# Patient Record
Sex: FEMALE | Race: OTHER | HISPANIC OR LATINO | Employment: UNEMPLOYED | URBAN - METROPOLITAN AREA
[De-identification: names, ages, dates, MRNs, and addresses within clinical notes are randomized per-mention and may not be internally consistent; named-entity substitution may affect disease eponyms.]

---

## 2018-11-29 ENCOUNTER — HOSPITAL ENCOUNTER (EMERGENCY)
Facility: HOSPITAL | Age: 63
Discharge: HOME | End: 2018-11-29
Attending: EMERGENCY MEDICINE
Payer: COMMERCIAL

## 2018-11-29 VITALS
OXYGEN SATURATION: 97 % | SYSTOLIC BLOOD PRESSURE: 153 MMHG | TEMPERATURE: 97.7 F | HEIGHT: 59 IN | DIASTOLIC BLOOD PRESSURE: 80 MMHG | BODY MASS INDEX: 33.58 KG/M2 | HEART RATE: 107 BPM | WEIGHT: 166.56 LBS | RESPIRATION RATE: 18 BRPM

## 2018-11-29 DIAGNOSIS — B02.9 SHINGLES: Primary | ICD-10-CM

## 2018-11-29 PROCEDURE — 99282 EMERGENCY DEPT VISIT SF MDM: CPT

## 2018-11-29 RX ORDER — VALACYCLOVIR HYDROCHLORIDE 500 MG/1
1000 TABLET, FILM COATED ORAL EVERY 8 HOURS SCHEDULED
Status: DISCONTINUED | OUTPATIENT
Start: 2018-11-29 | End: 2018-11-29 | Stop reason: HOSPADM

## 2018-11-29 RX ORDER — VALACYCLOVIR HYDROCHLORIDE 1 G/1
1000 TABLET, FILM COATED ORAL EVERY 8 HOURS SCHEDULED
Qty: 21 TABLET | Refills: 0 | Status: SHIPPED | OUTPATIENT
Start: 2018-11-29 | End: 2018-12-06

## 2018-11-29 RX ADMIN — VALACYCLOVIR HYDROCHLORIDE 1000 MG: 500 TABLET, FILM COATED ORAL at 13:13

## 2018-11-29 NOTE — ED PROVIDER NOTES
History  Chief Complaint   Patient presents with    Rash     pt states she started with a rash 11/24  rash painful and located on left upper back  Rash   Location:  Torso  Torso rash location:  Upper back and L chest  Quality: blistering, burning, itchiness, painful, redness and swelling    Pain details:     Quality:  Hot, burning, itching, tingling, sharp and stinging    Severity:  Severe    Onset quality:  Sudden    Duration:  5 days    Timing:  Constant    Progression:  Worsening  Severity:  Severe  Onset quality:  Sudden  Duration:  5 days  Timing:  Constant  Progression:  Worsening  Chronicity:  New  Context: not animal contact, not chemical exposure, not exposure to similar rash, not insect bite/sting, not new detergent/soap, not sick contacts and not sun exposure    Relieved by:  Nothing  Worsened by:  Contact  Ineffective treatments:  None tried      None       Past Medical History:   Diagnosis Date    Hypertension        Past Surgical History:   Procedure Laterality Date    HYSTERECTOMY         History reviewed  No pertinent family history  I have reviewed and agree with the history as documented  Social History   Substance Use Topics    Smoking status: Never Smoker    Smokeless tobacco: Never Used    Alcohol use No        Review of Systems   Constitutional: Negative  HENT: Negative  Respiratory: Negative  Cardiovascular: Negative  Gastrointestinal: Negative  Endocrine: Negative  Genitourinary: Negative  Musculoskeletal: Positive for back pain  Negative for neck stiffness  Skin: Positive for rash  Allergic/Immunologic: Negative  Neurological: Negative  Hematological: Negative for adenopathy  Psychiatric/Behavioral: Negative          Physical Exam  ED Triage Vitals [11/29/18 1218]   Temperature Pulse Respirations Blood Pressure SpO2   97 7 °F (36 5 °C) (!) 121 16 160/81 98 %      Temp Source Heart Rate Source Patient Position - Orthostatic VS BP Location FiO2 (%)   Tympanic Monitor Sitting Right arm --      Pain Score       --           Orthostatic Vital Signs  Vitals:    11/29/18 1218   BP: 160/81   Pulse: (!) 121   Patient Position - Orthostatic VS: Sitting       Physical Exam   Constitutional: She is oriented to person, place, and time  She appears well-developed and well-nourished  No distress  HENT:   Head: Normocephalic and atraumatic  Eyes: Pupils are equal, round, and reactive to light  EOM are normal  Right eye exhibits no discharge  Left eye exhibits no discharge  Neck: Normal range of motion  Neck supple  No JVD present  Cardiovascular: Normal rate, regular rhythm and normal heart sounds  Pulmonary/Chest: Effort normal and breath sounds normal  No respiratory distress  Abdominal: Soft  Lymphadenopathy:     She has no cervical adenopathy  Neurological: She is alert and oriented to person, place, and time  Skin: Skin is warm and dry  Capillary refill takes 2 to 3 seconds  Lesion and rash noted  Rash is vesicular  She is not diaphoretic  Vesicular lesions with some crusting at various stages of healing  Vesicular lesions spread from scapular area laterally under the axilla and towards breast   No lesions on breast        ED Medications  Medications   valACYclovir (VALTREX) tablet 1,000 mg (not administered)       Diagnostic Studies  Results Reviewed     None                 No orders to display         Procedures  Procedures      Phone Consults  ED Phone Contact    ED Course                               MDM  Number of Diagnoses or Management Options  Shingles: new and does not require workup  Diagnosis management comments: Patient with hx of chicken pox as child presents with 5 day rash on left upper back and mildly on left upper chest  Rash is painful vesicular, pruritic and lesions at various stages of healing consistent with shingles  She has no evidence or history of immunodeficiency or encephalitis   Prescribed valacyclovir 1000mg PO TID for seven days  Patient has an appointment with PCP on December 6th 2018 and will follow up there  Patient educated on contact precautions  Patient educated on signs of worsening condition and when to return to the emergency department emergently  Risk of Complications, Morbidity, and/or Mortality  Presenting problems: low  Diagnostic procedures: minimal  Management options: low    Patient Progress  Patient progress: stable    CritCare Time    Disposition  Final diagnoses:   Shingles     Time reflects when diagnosis was documented in both MDM as applicable and the Disposition within this note     Time User Action Codes Description Comment    11/29/2018  1:07 PM Kaylie Butcher Add [B02 9] Shingles       ED Disposition     ED Disposition Condition Comment    Discharge  [address] discharge to home/self care  Condition at discharge: Stable        Follow-up Information     Follow up With Specialties Details Why [facility] Schedule an appointment as soon as possible for a visit in 4 days  [address] Emergency Department Emergency Medicine  If symptoms worsen [address] [facility] [id number] [phone]          Patient's Medications   Discharge Prescriptions    VALACYCLOVIR (VALTREX) 1,000 MG TABLET    Take 1 tablet (1,000 mg total) by mouth every 8 (eight) hours for 21 doses       Start Date: 11/29/2018End Date: 12/6/2018       Order Dose: 1,000 mg       Quantity: 21 tablet    Refills: 0     No discharge procedures on file  ED Provider  Attending physically available and evaluated [address]  I managed the patient along with the ED Attending      Electronically Signed by         Rocky Corona PA-C  11/29/18 9543

## 2018-11-29 NOTE — DISCHARGE INSTRUCTIONS
Culebrilla   LO QUE NECESITA SABER:   La culebrilla es joseph infección dolorosa causada por el mismo virus que causa la varicela (virus varicela-zóster)  Después de contagiarse con varicela, el virus permanece en hernandez cuerpo por varios años sin causar ningún síntoma  La culebrilla ocurre cuando el virus se activa nuevamente  Boby Lacrosse que se activa, el virus viaja por un nervio hasta hernandez piel y provoca un sarpullido  INSTRUCCIONES SOBRE EL [facility]ITAL[facility]:   Regrese a la tae de emergencias si:   · La piel alrededor de las ampollas está adolorida, enrojecida y caliente o las ampollas drenan pus  · Usted tiene rigidez en el adrienne o dificultad para moverlo  · Usted tiene dificultad para  shweta brazos, piernas o brenton  · Usted sufre joseph convulsión  · Usted tiene debilidad en un brazo o en joseph pierna  · Usted se siente confundido o tiene dificultad para hablar  · Usted se siente mareado, tiene dolor de [place] severo o pérdida de audición o visión  Pregúntele a hernandez [place] vitaminas y minerales son adecuados para usted  · Usted se siente débil o tiene dolor de [place]  · Usted tiene tos, escalofríos o fiebre  · Usted tiene dolor abdominal, náuseas o vómitos  · El sarpullido le causa más picazón o dolor  · El sarpullido se propaga a otras partes de hernandez cuerpo  · Hernandez dolor empeora y no desaparece aún después de alka medicamento  · Usted tiene preguntas o inquietudes acerca de hernandez condición o cuidado  Medicamentos:   · Medicamentos antivirales  ayudan a reducir los síntomas y el tiempo de recuperación  También podrían reducir hernandez riesgo de dolor de [place]  Para prevenir el dolor de nervio, es necesario alka ruma medicamento dentro de los primeros 3 ronnie del inicio de los síntomas      · Los analgésicos  pueden ser recetados o sugeridos por hernandez médico  Dependiendo de la severidad de hernandez dolor, usted podría necesitar medicamentos antiinflamatorios para el dolor, acetaminofén u opiáceos  No espere a que hernandez dolor sea severo para alka más medicamentos para el dolor  · Los anestésicos tópicos  se usan para entumecer la piel y reducir el dolor  Son disponibles en forma de crema, gel, aerosol o un parche  · Los anticonvulsivos  reducen el dolor de nervio y podrían facilitar hernandez habilidad para dormir  · Antidepresivos  se usan para reducir el dolor de nervio  · [place] shweta medicamentos alba se le haya indicado  Consulte con hernandez médico si usted gina que hernandez medicamento no le está ayudando o si presenta efectos secundarios  Infórmele si es alérgico a algún medicamento  Mantenga joseph lista actualizada de los [employer], las vitaminas y los productos herbales que miki  Incluya los siguientes datos de los medicamentos: cantidad, frecuencia y motivo de administración  Traiga con usted la lista o los envases de la píldoras a shweta citas de seguimiento  Lleve la lista de los medicamentos con usted en raghav de joseph emergencia  Acuda a shweta consultas de control con hernandez médico según le indicaron  Anote shweta preguntas para que se acuerde de hacerlas iveth shweta visitas  Cuidados personales:  Mantenga el sarpullido limpio y [address] sarpullido con un vendaje o ropa  No utilice vendajes que se pegan a la piel  La parte pegajosa podría irritar la piel y prolongar el sarpullido  Prevenga la propagación de la culebrilla:  Es posible transmitir el virus a joseph persona que nunca ha tenido varicela  Joseph vez expuesto al virus, esta persona puede contraer la varicela, devan no culebrilla  Es posible transmitir el virus a otras personas mientras tiene el sarpullido  El virus se transmite por contacto directo con el líquido de las [place]  Por lo general, no es posible transmitir el virus joseph vez que se sequen las ampollas  Prevenga la culebrilla u otro brote de culebrilla:  Es posible que se administre joseph vacuna para facilitar la prevención de la culebrilla   Pida más información acerca de [place] vacuna  © 2017 [address] [facility] Information is for End User's use only and may not be sold, redistributed or otherwise used for commercial purposes  All illustrations and images included in CareNotes® are the copyrighted property of A D A M , Inc  or Davian Smith  Esta información es sólo para uso en educación  Hernandez intención no es darle un consejo médico sobre enfermedades o tratamientos  Colsulte con hernandez Ladena Creed farmacéutico antes de seguir cualquier régimen médico para saber si es seguro y efectivo para usted